# Patient Record
Sex: FEMALE | Race: BLACK OR AFRICAN AMERICAN | NOT HISPANIC OR LATINO | URBAN - METROPOLITAN AREA
[De-identification: names, ages, dates, MRNs, and addresses within clinical notes are randomized per-mention and may not be internally consistent; named-entity substitution may affect disease eponyms.]

---

## 2019-06-21 ENCOUNTER — EMERGENCY (EMERGENCY)
Facility: HOSPITAL | Age: 79
LOS: 1 days | Discharge: HOME | End: 2019-06-21
Attending: EMERGENCY MEDICINE
Payer: COMMERCIAL

## 2019-06-21 VITALS
RESPIRATION RATE: 20 BRPM | TEMPERATURE: 98 F | HEART RATE: 66 BPM | SYSTOLIC BLOOD PRESSURE: 181 MMHG | OXYGEN SATURATION: 96 % | HEIGHT: 65 IN | WEIGHT: 210.1 LBS | DIASTOLIC BLOOD PRESSURE: 93 MMHG

## 2019-06-21 VITALS
DIASTOLIC BLOOD PRESSURE: 86 MMHG | RESPIRATION RATE: 17 BRPM | TEMPERATURE: 98 F | SYSTOLIC BLOOD PRESSURE: 158 MMHG | HEART RATE: 62 BPM | OXYGEN SATURATION: 95 %

## 2019-06-21 PROCEDURE — 99283 EMERGENCY DEPT VISIT LOW MDM: CPT

## 2019-06-21 RX ORDER — BRIMONIDINE TARTRATE 2 MG/MG
1 SOLUTION/ DROPS OPHTHALMIC ONCE
Refills: 0 | Status: COMPLETED | OUTPATIENT
Start: 2019-06-21 | End: 2019-06-21

## 2019-06-21 RX ORDER — ACETAZOLAMIDE 250 MG/1
500 TABLET ORAL ONCE
Refills: 0 | Status: COMPLETED | OUTPATIENT
Start: 2019-06-21 | End: 2019-06-21

## 2019-06-21 RX ORDER — LATANOPROST 0.05 MG/ML
1 SOLUTION/ DROPS OPHTHALMIC; TOPICAL AT BEDTIME
Refills: 0 | Status: DISCONTINUED | OUTPATIENT
Start: 2019-06-21 | End: 2019-06-25

## 2019-06-21 RX ORDER — PREDNISOLONE SODIUM PHOSPHATE 1 %
1 DROPS OPHTHALMIC (EYE) ONCE
Refills: 0 | Status: COMPLETED | OUTPATIENT
Start: 2019-06-21 | End: 2019-06-21

## 2019-06-21 RX ORDER — ATROPINE SULFATE 1 %
1 DROPS OPHTHALMIC (EYE) ONCE
Refills: 0 | Status: COMPLETED | OUTPATIENT
Start: 2019-06-21 | End: 2019-06-21

## 2019-06-21 RX ORDER — TIMOLOL 0.5 %
1 DROPS OPHTHALMIC (EYE) ONCE
Refills: 0 | Status: COMPLETED | OUTPATIENT
Start: 2019-06-21 | End: 2019-06-21

## 2019-06-21 RX ADMIN — ACETAZOLAMIDE 500 MILLIGRAM(S): 250 TABLET ORAL at 22:58

## 2019-06-21 RX ADMIN — Medication 1 DROP(S): at 22:59

## 2019-06-21 RX ADMIN — BRIMONIDINE TARTRATE 1 DROP(S): 2 SOLUTION/ DROPS OPHTHALMIC at 23:00

## 2019-06-21 RX ADMIN — Medication 1 DROP(S): at 23:02

## 2019-06-21 RX ADMIN — LATANOPROST 1 DROP(S): 0.05 SOLUTION/ DROPS OPHTHALMIC; TOPICAL at 23:01

## 2019-06-21 RX ADMIN — Medication 1 DROP(S): at 23:00

## 2019-06-21 NOTE — ED ADULT NURSE NOTE - OBJECTIVE STATEMENT
78 y/o female A&Ox3 presents to ED for increasing left eye pain. As per pt, woke up at 2AM with left eye pain and was rubbing the eye, felt like "sand was stuck" in her eye, took 2 alleve with no relief of pain. Pt baseline 80 y/o female A&Ox3 presents to ED for increasing left eye pain. As per pt, woke up at 2AM with left eye pain and was rubbing the eye, felt like "sand was stuck" in her eye, took 2 Alleve with no relief of pain. Upon assessment, left eye appears red and swollen, sclera is grey with blue dot in the middle which pt states is her baseline. Pt reports no vision from left eye which is her baseline, no change in vision from right eye. Denies any photosensitivity, discharge, itching, fevers or chills. Pt wears glasses, no use of contacts. Safety measures maintained.

## 2019-06-21 NOTE — ED ADULT NURSE NOTE - NSIMPLEMENTINTERV_GEN_ALL_ED
Implemented All Universal Safety Interventions:  [place] to call system. Call bell, personal items and telephone within reach. Instruct patient to call for assistance. Room bathroom lighting operational. Non-slip footwear when patient is off stretcher. Physically safe environment: no spills, clutter or unnecessary equipment. Stretcher in lowest position, wheels locked, appropriate side rails in place.

## 2019-06-21 NOTE — ED ADULT NURSE NOTE - CHPI ED NUR SYMPTOMS NEG
no double vision/no drainage/no itching/no blurred vision/no foreign body/no purulent drainage/no discharge/no photophobia

## 2019-06-21 NOTE — ED PROVIDER NOTE - OBJECTIVE STATEMENT
Attending note. Patient was seen in the fast track eye room.  This is complaining of pain about the left eye which started this morning with increasing redness and clotting of the cornea. Patient reports no vision in the left eye since a young age, so vision did not worsen.  She denies any acute trauma or injury. She denies any headache or change in vision in the right eye. Attending note. Patient was seen in the fast track eye room.  This is complaining of pain about the left eye which started this morning with increasing redness and clotting of the cornea. Patient reports no vision in the left eye since a young age, so vision did not worsen.  She denies any acute trauma or injury. She denies any headache or change in vision in the right eye.    Sagar Lee, PGY-1 EM: 79y female with PMH of HTN, Colon CA presenting with left eye pain.  Started this morning with pain around the orbit, feels like sand in her eye, she also noticed eye redness this morning.  She has not had vision in the eye for years, had a congenital abnormality with the left eye. No swelling around the eye.  Took 2 aleve this morning with no improvement in pain.

## 2019-06-21 NOTE — ED PROVIDER NOTE - PHYSICAL EXAMINATION
Attending note. Patient is alert and in no acute distress. Examination the patient's face reveals no orbital swelling or ecchymosis. Eyelids, eyelashes and lacrimal ducts were normal and nontender. Patient has a clouded left cornea with scleral injection. There is no fluorescein dye uptake. Extraocular muscles are intact. Patient has no vision in the left eye. I pressures were 82-85 in the left eye and 13 in the right eye.

## 2019-06-21 NOTE — ED PROVIDER NOTE - CLINICAL SUMMARY MEDICAL DECISION MAKING FREE TEXT BOX
Attending note. 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 Discussed with ophthalmology. See medications as prescribed. Patient will be discharged with these medications and frequency indicated and will follow up with ophthalmology Monday morning at 9 AM. She may take Tylenol for pain if necessary.

## 2019-06-21 NOTE — ED PROVIDER NOTE - NSFOLLOWUPINSTRUCTIONS_ED_ALL_ED_FT
Use the following eye drops in the left eye:  Timolol - one drop twice daily  Atropine - one drop three times daily  Pred Forte - one drop twice daily  Xalatan - one drop at night  Brimonidine - one drop three times daily  MUST Go to the Ophthalmology Clinic - [address] [place] [address], [facility] at 9am on Monday, June 24th  [phone]

## 2019-06-24 ENCOUNTER — APPOINTMENT (OUTPATIENT)
Dept: OPHTHALMOLOGY | Facility: CLINIC | Age: 79
End: 2019-06-24

## 2019-06-24 PROBLEM — I10 ESSENTIAL (PRIMARY) HYPERTENSION: Chronic | Status: ACTIVE | Noted: 2019-06-21

## 2019-06-24 PROBLEM — C18.9 MALIGNANT NEOPLASM OF COLON, UNSPECIFIED: Chronic | Status: ACTIVE | Noted: 2019-06-21

## 2019-07-02 ENCOUNTER — APPOINTMENT (OUTPATIENT)
Dept: OPHTHALMOLOGY | Facility: CLINIC | Age: 79
End: 2019-07-02

## 2019-08-01 ENCOUNTER — APPOINTMENT (OUTPATIENT)
Dept: OPHTHALMOLOGY | Facility: CLINIC | Age: 79
End: 2019-08-01